# Patient Record
(demographics unavailable — no encounter records)

---

## 2025-05-09 NOTE — HISTORY OF PRESENT ILLNESS
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  She was first seen by Dr. Noyola in 2022 for the first visit. History was obtained partially with Carmen present but likely that she does not quite understand the implications. 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 At birth she was noted to have female genitalia. Her chromosome was confirmed to be 47 XXY, and FISH showed absence of SRY. She was previously followed by Dr. Ruiz in [place] until year of age. 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 Dr. Ruiz by report recommended of gonadectomy due to a risk of gonadoblastoma. Mother reported they ultimately decided not to pursue it yet, and they were recommended to have her be followed closely with yearly Ultrasound but as noted no follow-up since 2 yo. Mother reported she was always high percentiles for height and weight. She eats an healthy diet with vegetables and fruits. She sometimes takes Miralax for constipation. A pelvic ultrasound was done 2021 which was normal. The ovaries were seen, with a normal grayscale appearance, physiologic follicles and no adnexal mass. Thyroid US was done 22 and was normal due to carroll thyroid. We reviewed what is expected for children with Klinefelter syndrome and also 46 XY patients with SRY deletion or mutation. 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 Mother also reported that the last US they have noted follicles, the report we received only note orthotopic seeming ovaries. we agree literature has also suggested possibility of fertility. Mother did enquire whether she should have repeat of her genetic testing we feel unnecessary she had prenatal and  testing. We requested repeat pelvic US and complete blood work include tumor markers, CBC, CMP, TFT's due to neck swelling, and vitamin D level. Results were normal, vitamin D just minimally insufficient. Pelvic U/S showed uterus and ovaries normal for age. Recommended GRUPE but mother decided against it.  She was seen in 23 for follow-up, U/S findings similar, not quite able to visualize ovaries and tumor markers negative. F/u in 1 year recommended.  She followed up most recently 2024. May 2024 U/S showed uterus presumably measured 3.6 x 1 x 1.4 cm. She was started on as needed Airsupra for her asthma. She had results with obtained that again showed negative tumor markers and She had no other issues. She was eating a healthy diet and physically active. There were no signs of puberty noted.  Since the last visit, there had no concerns for growth she has grown very well. She needed new clothes and new shoes. She is in the 3rd grade and doing well. She is still using Airsupra as needed for asthma. She uses it daily when sick or for gym. Regarding her diet, she is eating cookies and muffins after school. We advised to eat a meal first which would help limit her sugar craving. Mother makes sure she is physically active and walks around the house for 10 minutes daily. There are no reported signs of puberty. She is taking a MVI daily.

## 2025-05-09 NOTE — CONSULT LETTER
[Dear  ___] : Dear  [unfilled], [Courtesy Letter:] : I had the pleasure of seeing your patient, [unfilled], in my office today. [Please see my note below.] : Please see my note below. [Consult Closing:] : Thank you very much for allowing me to participate in the care of this patient.  If you have any questions, please do not hesitate to contact me. [Sincerely,] : Sincerely, [FreeTextEntry3] : YeouChing Hsu, MD \par  Division of Pediatric Endocrinology \par  [facility] \par   of Pediatrics \par  [facility] [employer] [facility]\par

## 2025-05-09 NOTE — CONSULT LETTER
[Dear  ___] : Dear  [unfilled], [Courtesy Letter:] : I had the pleasure of seeing your patient, [unfilled], in my office today. [Please see my note below.] : Please see my note below. [Consult Closing:] : Thank you very much for allowing me to participate in the care of this patient.  If you have any questions, please do not hesitate to contact me. [Sincerely,] : Sincerely, [FreeTextEntry3] : YeouChing Hsu, MD \par  Division of Pediatric Endocrinology \par  [facility] \par   of Pediatrics \par  [facility] [employer] at [facility]\par

## 2025-05-09 NOTE — PHYSICAL EXAM
[Healthy Appearing] : healthy appearing [Well Nourished] : well nourished [Interactive] : interactive [Normal Appearance] : normal appearance [Well formed] : well formed [Normally Set] : normally set [Normal S1 and S2] : normal S1 and S2 [Clear to Ausculation Bilaterally] : clear to auscultation bilaterally [Abdomen Soft] : soft [Abdomen Tenderness] : non-tender [] : no hepatosplenomegaly [Normal] : normal  [Acanthosis Nigricans___] : acanthosis nigricans over [unfilled] [1] : was Dustin stage 1 [Dustin Stage ___] : the Dustin stage for breast development was [unfilled] [Dysmorphic] : non-dysmorphic [Murmur] : no murmurs [de-identified] : +permanent central incisors [FreeTextEntry2] : absent

## 2025-05-09 NOTE — PHYSICAL EXAM
[Healthy Appearing] : healthy appearing [Well Nourished] : well nourished [Interactive] : interactive [Normal Appearance] : normal appearance [Well formed] : well formed [Normally Set] : normally set [Normal S1 and S2] : normal S1 and S2 [Clear to Ausculation Bilaterally] : clear to auscultation bilaterally [Abdomen Soft] : soft [Abdomen Tenderness] : non-tender [] : no hepatosplenomegaly [Normal] : normal  [Acanthosis Nigricans___] : acanthosis nigricans over [unfilled] [1] : was Dustin stage 1 [Dustin Stage ___] : the Dustin stage for breast development was [unfilled] [Dysmorphic] : non-dysmorphic [Murmur] : no murmurs [de-identified] : +permanent central incisors [FreeTextEntry2] : absent

## 2025-05-09 NOTE — PHYSICAL EXAM
[Healthy Appearing] : healthy appearing [Well Nourished] : well nourished [Interactive] : interactive [Normal Appearance] : normal appearance [Well formed] : well formed [Normally Set] : normally set [Normal S1 and S2] : normal S1 and S2 [Clear to Ausculation Bilaterally] : clear to auscultation bilaterally [Abdomen Soft] : soft [Abdomen Tenderness] : non-tender [] : no hepatosplenomegaly [Normal] : normal  [Acanthosis Nigricans___] : acanthosis nigricans over [unfilled] [1] : was Dustin stage 1 [Dustin Stage ___] : the Dustin stage for breast development was [unfilled] [Dysmorphic] : non-dysmorphic [Murmur] : no murmurs [de-identified] : +permanent central incisors [FreeTextEntry2] : absent

## 2025-05-09 NOTE — CONSULT LETTER
[Dear  ___] : Dear  [unfilled], [Courtesy Letter:] : I had the pleasure of seeing your patient, [unfilled], in my office today. [Please see my note below.] : Please see my note below. [Consult Closing:] : Thank you very much for allowing me to participate in the care of this patient.  If you have any questions, please do not hesitate to contact me. [Sincerely,] : Sincerely, [FreeTextEntry3] : YeouChing Hsu, MD \par  Division of Pediatric Endocrinology \par  [facility] \par   of Pediatrics \par  [facility] [employer] at [facility]\par

## 2025-05-09 NOTE — HISTORY OF PRESENT ILLNESS
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  She was first seen by Dr. Noyola in 2022 for the first visit. History was obtained partially with Carmen present but likely that she does not quite understand the implications. 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 At birth she was noted to have female genitalia. Her chromosome was confirmed to be 47 XXY, and FISH showed absence of SRY. She was previously followed by Dr. Ruiz in [place] until year of age. 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 Dr. Ruiz by report recommended of gonadectomy due to a risk of gonadoblastoma. Mother reported they ultimately decided not to pursue it yet, and they were recommended to have her be followed closely with yearly Ultrasound but as noted no follow-up since 2 yo. Mother reported she was always high percentiles for height and weight. She eats an healthy diet with vegetables and fruits. She sometimes takes Miralax for constipation. A pelvic ultrasound was done 2021 which was normal. The ovaries were seen, with a normal grayscale appearance, physiologic follicles and no adnexal mass. Thyroid US was done 22 and was normal due to carroll thyroid. We reviewed what is expected for children with Klinefelter syndrome and also 46 XY patients with SRY deletion or mutation. 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 Mother also reported that the last US they have noted follicles, the report we received only note orthotopic seeming ovaries. we agree literature has also suggested possibility of fertility. Mother did enquire whether she should have repeat of her genetic testing we feel unnecessary she had prenatal and  testing. We requested repeat pelvic US and complete blood work include tumor markers, CBC, CMP, TFT's due to neck swelling, and vitamin D level. Results were normal, vitamin D just minimally insufficient. Pelvic U/S showed uterus and ovaries normal for age. Recommended GRUPE but mother decided against it.  She was seen in 23 for follow-up, U/S findings similar, not quite able to visualize ovaries and tumor markers negative. F/u in 1 year recommended.  She followed up most recently 2024. May 2024 U/S showed uterus presumably measured 3.6 x 1 x 1.4 cm. She was started on as needed Airsupra for her asthma. She had results with obtained that again showed negative tumor markers and She had no other issues. She was eating a healthy diet and physically active. There were no signs of puberty noted.  Since the last visit, there had no concerns for growth she has grown very well. She needed new clothes and new shoes. She is in the 3rd grade and doing well. She is still using Airsupra as needed for asthma. She uses it daily when sick or for gym. Regarding her diet, she is eating cookies and muffins after school. We advised to eat a meal first which would help limit her sugar craving. Mother makes sure she is physically active and walks around the house for 10 minutes daily. There are no reported signs of puberty. She is taking a MVI daily.

## 2025-05-09 NOTE — PHYSICAL EXAM
[Healthy Appearing] : healthy appearing [Well Nourished] : well nourished [Interactive] : interactive [Normal Appearance] : normal appearance [Well formed] : well formed [Normally Set] : normally set [Normal S1 and S2] : normal S1 and S2 [Clear to Ausculation Bilaterally] : clear to auscultation bilaterally [Abdomen Soft] : soft [Abdomen Tenderness] : non-tender [] : no hepatosplenomegaly [Normal] : normal  [Acanthosis Nigricans___] : acanthosis nigricans over [unfilled] [1] : was Dustin stage 1 [Dustin Stage ___] : the Dustin stage for breast development was [unfilled] [Dysmorphic] : non-dysmorphic [Murmur] : no murmurs [de-identified] : +permanent central incisors [FreeTextEntry2] : absent

## 2025-05-09 NOTE — CONSULT LETTER
[Dear  ___] : Dear  [unfilled], [Courtesy Letter:] : I had the pleasure of seeing your patient, [unfilled], in my office today. [Please see my note below.] : Please see my note below. [Consult Closing:] : Thank you very much for allowing me to participate in the care of this patient.  If you have any questions, please do not hesitate to contact me. [Sincerely,] : Sincerely, [FreeTextEntry3] : YeouChing Hsu, MD \par  Division of Pediatric Endocrinology \par  [facility] \par   of Pediatrics \par  [facility] [employer] at [facility]\par

## 2025-05-09 NOTE — REVIEW OF SYSTEMS
[Nl] : Neurological [Wgt Gain (___ Lbs)] : recent [unfilled] lb weight gain [Constipation] : no constipation [Heat Intolerance] : heat tolerant [Cold Intolerance] : no intolerance to cold [Polydypsia] : no polydipsia [Polyuria] : no polyuria

## 2025-05-09 NOTE — HISTORY OF PRESENT ILLNESS
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  She was first seen by Dr. Noyola in 2022 for the first visit. History was obtained partially with Carmen present but likely that she does not quite understand the implications. 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 At birth she was noted to have female genitalia. Her chromosome was confirmed to be 47 XXY, and FISH showed absence of SRY. She was previously followed by Dr. Ruiz in [place] until year of age. 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 Dr. Ruiz by report recommended of gonadectomy due to a risk of gonadoblastoma. Mother reported they ultimately decided not to pursue it yet, and they were recommended to have her be followed closely with yearly Ultrasound but as noted no follow-up since 2 yo. Mother reported she was always high percentiles for height and weight. She eats an healthy diet with vegetables and fruits. She sometimes takes Miralax for constipation. A pelvic ultrasound was done 2021 which was normal. The ovaries were seen, with a normal grayscale appearance, physiologic follicles and no adnexal mass. Thyroid US was done 22 and was normal due to carroll thyroid. We reviewed what is expected for children with Klinefelter syndrome and also 46 XY patients with SRY deletion or mutation. 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 Mother also reported that the last US they have noted follicles, the report we received only note orthotopic seeming ovaries. we agree literature has also suggested possibility of fertility. Mother did enquire whether she should have repeat of her genetic testing we feel unnecessary she had prenatal and  testing. We requested repeat pelvic US and complete blood work include tumor markers, CBC, CMP, TFT's due to neck swelling, and vitamin D level. Results were normal, vitamin D just minimally insufficient. Pelvic U/S showed uterus and ovaries normal for age. Recommended GRUPE but mother decided against it.  She was seen in 23 for follow-up, U/S findings similar, not quite able to visualize ovaries and tumor markers negative. F/u in 1 year recommended.  She followed up most recently 2024. May 2024 U/S showed uterus presumably measured 3.6 x 1 x 1.4 cm. She was started on as needed Airsupra for her asthma. She had results with obtained that again showed negative tumor markers and She had no other issues. She was eating a healthy diet and physically active. There were no signs of puberty noted.  Since the last visit, there had no concerns for growth she has grown very well. She needed new clothes and new shoes. She is in the 3rd grade and doing well. She is still using Airsupra as needed for asthma. She uses it daily when sick or for gym. Regarding her diet, she is eating cookies and muffins after school. We advised to eat a meal first which would help limit her sugar craving. Mother makes sure she is physically active and walks around the house for 10 minutes daily. There are no reported signs of puberty. She is taking a MVI daily.

## 2025-05-09 NOTE — HISTORY OF PRESENT ILLNESS
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  She was first seen by Dr. Noyola in 2022 for the first visit. History was obtained partially with Carmen present but likely that she does not quite understand the implications. 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 At birth she was noted to have female genitalia. Her chromosome was confirmed to be 47 XXY, and FISH showed absence of SRY. She was previously followed by Dr. Ruiz in [place] until year of age. 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 Dr. Ruiz by report recommended of gonadectomy due to a risk of gonadoblastoma. Mother reported they ultimately decided not to pursue it yet, and they were recommended to have her be followed closely with yearly Ultrasound but as noted no follow-up since 2 yo. Mother reported she was always high percentiles for height and weight. She eats an healthy diet with vegetables and fruits. She sometimes takes Miralax for constipation. A pelvic ultrasound was done 2021 which was normal. The ovaries were seen, with a normal grayscale appearance, physiologic follicles and no adnexal mass. Thyroid US was done 22 and was normal due to carrlol thyroid. We reviewed what is expected for children with Klinefelter syndrome and also 46 XY patients with SRY deletion or mutation. 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 Mother also reported that the last US they have noted follicles, the report we received only note orthotopic seeming ovaries. we agree literature has also suggested possibility of fertility. Mother did enquire whether she should have repeat of her genetic testing we feel unnecessary she had prenatal and  testing. We requested repeat pelvic US and complete blood work include tumor markers, CBC, CMP, TFT's due to neck swelling, and vitamin D level. Results were normal, vitamin D just minimally insufficient. Pelvic U/S showed uterus and ovaries normal for age. Recommended GRUPE but mother decided against it.  She was seen in 23 for follow-up, U/S findings similar, not quite able to visualize ovaries and tumor markers negative. F/u in 1 year recommended.  She followed up most recently 2024. May 2024 U/S showed uterus presumably measured 3.6 x 1 x 1.4 cm. She was started on as needed Airsupra for her asthma. She had results with obtained that again showed negative tumor markers and She had no other issues. She was eating a healthy diet and physically active. There were no signs of puberty noted.  Since the last visit, there had no concerns for growth she has grown very well. She needed new clothes and new shoes. She is in the 3rd grade and doing well. She is still using Airsupra as needed for asthma. She uses it daily when sick or for gym. Regarding her diet, she is eating cookies and muffins after school. We advised to eat a meal first which would help limit her sugar craving. Mother makes sure she is physically active and walks around the house for 10 minutes daily. There are no reported signs of puberty. She is taking a MVI daily.